# Patient Record
Sex: MALE | Race: WHITE | NOT HISPANIC OR LATINO | URBAN - METROPOLITAN AREA
[De-identification: names, ages, dates, MRNs, and addresses within clinical notes are randomized per-mention and may not be internally consistent; named-entity substitution may affect disease eponyms.]

---

## 2018-12-21 NOTE — PATIENT DISCUSSION
Complete I personally performed the service described in the documentation recorded by the scribe in my presence, and it accurately and completely records my words and actions.

## 2018-12-21 NOTE — PATIENT DISCUSSION
Also, please do not hesitate to call us if you have any concerns not addressed by this information. Please call [phone] and we will do everything we can to help you during this [email or web address]

## 2020-10-05 ENCOUNTER — IMPORTED ENCOUNTER (OUTPATIENT)
Dept: URBAN - METROPOLITAN AREA CLINIC 50 | Facility: CLINIC | Age: 55
End: 2020-10-05

## 2020-10-19 ENCOUNTER — IMPORTED ENCOUNTER (OUTPATIENT)
Dept: URBAN - METROPOLITAN AREA CLINIC 50 | Facility: CLINIC | Age: 55
End: 2020-10-19

## 2021-04-17 ASSESSMENT — VISUAL ACUITY
OS_SC: 20/20-2
OS_PH: @ 16 IN
OS_BAT: 20/20
OD_BAT: 20/20
OS_CC: J1+
OD_SC: 20/30
OD_CC: J1+
OD_CC: J3@ 16 IN
OD_CC: 20/25
OD_PH: @ 16 IN
OS_CC: J3@ 16 IN
OS_CC: 20/25
OD_OTHER: 20/20. 20/20.
OS_OTHER: 20/20. 20/20.

## 2021-04-17 ASSESSMENT — TONOMETRY
OS_IOP_MMHG: 14
OD_IOP_MMHG: 14